# Patient Record
Sex: FEMALE
[De-identification: names, ages, dates, MRNs, and addresses within clinical notes are randomized per-mention and may not be internally consistent; named-entity substitution may affect disease eponyms.]

---

## 2018-11-14 ENCOUNTER — HOSPITAL ENCOUNTER (INPATIENT)
Dept: HOSPITAL 14 - H.EROB2 | Age: 33
LOS: 2 days | Discharge: HOME | End: 2018-11-16
Attending: OBSTETRICS & GYNECOLOGY | Admitting: OBSTETRICS & GYNECOLOGY
Payer: MEDICAID

## 2018-11-14 VITALS — BODY MASS INDEX: 30.7 KG/M2

## 2018-11-14 DIAGNOSIS — Z3A.39: ICD-10-CM

## 2018-11-14 LAB
BASOPHILS # BLD AUTO: 0 K/UL (ref 0–0.2)
BASOPHILS NFR BLD: 0.5 % (ref 0–2)
EOSINOPHIL # BLD AUTO: 0 K/UL (ref 0–0.7)
EOSINOPHIL NFR BLD: 0.6 % (ref 0–4)
ERYTHROCYTE [DISTWIDTH] IN BLOOD BY AUTOMATED COUNT: 15 % (ref 11.5–14.5)
HGB BLD-MCNC: 13.5 G/DL (ref 12–16)
LYMPHOCYTES # BLD AUTO: 2.5 K/UL (ref 1–4.3)
LYMPHOCYTES NFR BLD AUTO: 33.5 % (ref 20–40)
MCH RBC QN AUTO: 30.4 PG (ref 27–31)
MCHC RBC AUTO-ENTMCNC: 32.7 G/DL (ref 33–37)
MCV RBC AUTO: 93.1 FL (ref 81–99)
MONOCYTES # BLD: 0.7 K/UL (ref 0–0.8)
MONOCYTES NFR BLD: 9.3 % (ref 0–10)
NEUTROPHILS # BLD: 4.1 K/UL (ref 1.8–7)
NEUTROPHILS NFR BLD AUTO: 56.1 % (ref 50–75)
NRBC BLD AUTO-RTO: 0.1 % (ref 0–0)
PLATELET # BLD: 151 K/UL (ref 130–400)
PMV BLD AUTO: 10 FL (ref 7.2–11.7)
RBC # BLD AUTO: 4.45 MIL/UL (ref 3.8–5.2)
WBC # BLD AUTO: 7.3 K/UL (ref 4.8–10.8)

## 2018-11-14 PROCEDURE — 4A1HXCZ MONITORING OF PRODUCTS OF CONCEPTION, CARDIAC RATE, EXTERNAL APPROACH: ICD-10-PCS | Performed by: OBSTETRICS & GYNECOLOGY

## 2018-11-14 NOTE — OBDS
===================================

DELIVERY PERSONNEL

===================================

   

Delivery Doctor:  Dr Mcadams

Circulator:  [id number]/[id number]

Resident:  Dr GASPER Carmona(OB Fellow)

   

===================================

MATERNAL INFORMATION

===================================

   

Delivery Anesthesia:  None

Medications in Delivery:  Pitocin

Placenta Cultured:  No

Maternal Complications:  None

   

===================================

LABOR SUMMARY

===================================

   

EDC:  2018 00:00

No. Babies in Womb:  1

 Attempted:  No

Labor Anesthesia:  None

   

===================================

LABOR INFORMATION

===================================

   

Reason for Induction:  Not Applicable

Onset of Labor:  2018 05:00

Complete Dilatation:  2018 10:45

Oxytocin:  N/A

Group B Beta Strep:  Negative

Antibiotics # of Doses:  n/a

Antibiotics Time of Last Dose:  n/a

Steroids Given:  None

Reason Steroids Not Administered:  Not Applicable

   

===================================

MEMBRANES

===================================

   

Membranes Rupture Method:  Spontaneous

Rupture of Membranes:  2018 11:00

Length of Rupture (hrs):  0.05

Amniotic Fluid Color:  Light Meconium

Amniotic Fluid Amount:  Moderate

Amniotic Fluid Odor:  Normal

   

===================================

STAGES OF LABOR

===================================

   

Stage 1 hrs:  5

Stage 1 min:  45

Stage 2 hrs:  0

Stage 2 min:  18

Stage 3 hrs:  0

Stage 3 min:  5

Total Time in Labor hrs:  6

Total Time in Labor min:  8

   

===================================

VAGINAL DELIVERY

===================================

   

Episiotomy:  None

Laceration Extension:  N/A

Laceration Type:  None

Laceration Repair:  Not Applicable

Initial Vag Sponge Count:  Laps=5  and one without ring

Final Vag Sponge Count:  laps=5 and one without ring

Initial Vag Sharps Count:  n/a

Final Vag Sharps Count:  n/a

Sponge Count Correct:  Yes

Sharps Count Correct:  N/A

Count Comment:  laps count correct

   

===================================

BABY A INFORMATION

===================================

   

Infant Delivery Date/Time:  2018 11:03

Method of Delivery:  Vaginal

Born in Route :  No

:  N/A

Forceps:  N/A

Vacuum Extraction:  N/A

Shoulder Dystocia :  No

   

===================================

SHOULDER DYSTOCIA BABY A

===================================

   

Infant Delivery Date/Time:  2018 11:03

   

===================================

PRESENTATION/POSITION BABY A

===================================

   

Presentation:  Cephalic

Cephalic Presentation:  Vertex

Breech Presentation:  N/A

   

===================================

PLACENTA INFORMATION BABY A

===================================

   

Placenta Delivery Time :  2018 11:08

Placenta Method of Delivery:  Spontaneous

Placenta Status:  Delivered

   

===================================

APGAR SCORES BABY A

===================================

   

Heart Rate 1 min:  >100 bpm

Resp Effort 1 min:  Good Cry

Reflex Irritability 1 min:  Cough or Sneeze or Pulls Away

Muscle Tone 1 min:  Active Motion

Color 1 min:  Body Pink, Extremities Blue

Resuscitation Effort 1 min:  Tactile Stimulation

APGAR SCORE 1 MIN:  9

Heart Rate 5 min:  >100 bpm

Resp Effort 5 min:  Good Cry

Reflex Irritability 5 min:  Cough or Sneeze or Pulls Away

Muscle Tone 5 min:  Active Motion

Color 5 min:  Body Pink, Extremities Blue

Resuscitation Effort 5 min:  N/A

APGAR SCORE 5 MIN:  9

   

===================================

INFANT INFORMATION BABY A

===================================

   

Gestational Age at Delivery:  39.3

Gestational Status:  Term

Infant Outcome :  Liveborn

Infant Condition :  Stable

Infant Sex:  Male

   

===================================

IDENTIFICATION/MEDS BABY A

===================================

   

ID Band Number:  [id number]

ID Band Location:  Left Leg; Left Arm



Vitamin K Given :  Not Given

Erythromycin Given:  Not Given

   

===================================

WEIGHT/LENGTH BABY A

===================================

   

Infant Birthweight (gms):  2860

Infant Weight (lb):  6

Infant Weight (oz):  5

   

===================================

CORD INFORMATION BABY A

===================================

   

No. Cord Vessels:  3

Nuchal Cord :  N/A

Nuchal Cord Other:  n/a

True Knot:  n/a

Infant Cord pH Baby Arterial:  n/a

Infant Cord pH Baby Venous:  n/a

Cord Blood Taken:  Yes

Banking/Donate Info:  n/a

Infant Suction:  None

   

===================================

ASSESSMENT BABY A

===================================

   

Infant Complications:  None

Physical Findings at Delivery:  Within Normal Limits

Infant Respirations:  Appears Normal

Neonatologist/ALS Called :  No

Infant Care By:  Alysa

Transferred To:  Remains with Mother

## 2018-11-14 NOTE — OBHP
===========================

Datetime: 11/14/2018 13:12

===========================

   

Admit Comment, IP Provider:  The patient was seen with the resident I agree with the note

## 2018-11-15 LAB
BASOPHILS # BLD AUTO: 0 K/UL (ref 0–0.2)
BASOPHILS NFR BLD: 0.6 % (ref 0–2)
EOSINOPHIL # BLD AUTO: 0.1 K/UL (ref 0–0.7)
EOSINOPHIL NFR BLD: 0.9 % (ref 0–4)
ERYTHROCYTE [DISTWIDTH] IN BLOOD BY AUTOMATED COUNT: 14.8 % (ref 11.5–14.5)
HGB BLD-MCNC: 11.9 G/DL (ref 12–16)
LYMPHOCYTES # BLD AUTO: 2.8 K/UL (ref 1–4.3)
LYMPHOCYTES NFR BLD AUTO: 34.8 % (ref 20–40)
MCH RBC QN AUTO: 30 PG (ref 27–31)
MCHC RBC AUTO-ENTMCNC: 33.2 G/DL (ref 33–37)
MCV RBC AUTO: 90.6 FL (ref 81–99)
MONOCYTES # BLD: 0.7 K/UL (ref 0–0.8)
MONOCYTES NFR BLD: 8.7 % (ref 0–10)
NEUTROPHILS # BLD: 4.4 K/UL (ref 1.8–7)
NEUTROPHILS NFR BLD AUTO: 55 % (ref 50–75)
NRBC BLD AUTO-RTO: 0.1 % (ref 0–0)
PLATELET # BLD: 147 K/UL (ref 130–400)
PMV BLD AUTO: 9.3 FL (ref 7.2–11.7)
RBC # BLD AUTO: 3.95 MIL/UL (ref 3.8–5.2)
WBC # BLD AUTO: 8 K/UL (ref 4.8–10.8)

## 2018-11-16 VITALS
HEART RATE: 82 BPM | TEMPERATURE: 98.3 F | SYSTOLIC BLOOD PRESSURE: 114 MMHG | OXYGEN SATURATION: 100 % | RESPIRATION RATE: 18 BRPM | DIASTOLIC BLOOD PRESSURE: 69 MMHG

## 2018-11-16 NOTE — OBPPN
===========================

Datetime: 2018 05:48

===========================

   

PP Progress Note Prov:  Danny interpretation services - [id number]

   32 y/o  PPD 2 s/p  seen and examined this AM. Patient reports she is tolerating ambulat
on, regular diet, _ pain with medications. Lochia like menses, + flatus, +BM,  breastfeeding w/o comp
laints. Denies any f/c/n/v/d, chest pain or shortness of breath.

      

   VS: stable

   Gen: laying in bed breastfeeding

   Cardio: s1s2 RRR

   Lungs: cResp effort wnl, no wheeze

   Abd: BS +, fundus @ umbilicus, appropriate tenderness

   Ext: calves nontender, nonedematous

      

   A/P: 32 y/o  PPD 2 s/p 

   -Breastfeeding encouraged

   -Ambulation as tolerated encouraged

   -Ibuprofen as needed for pain

   -Discharge today

      

   Case discussed with attending

   -LISA Bonner JP, PGY-1

   Attending addendum:

   I saw and examined the patient myself at bedside this morning. I reviewed the resident note above 
and agree with findings and management.

   DC home today.

   Prema Castano MD

## 2018-11-16 NOTE — OBPPN
===========================

Datetime: 2018 05:48

===========================

   

PP Pain Prov:  Within normal limits

PP Nausea Prov:  Denies

PP Flatus Prov:  Yes

PP BM Prov:  No

PP Heart Prov:  Normal

PP Lungs Prov:  Normal

PP Abdomen/Uterus Prov:  Normal

PP Lochia Prov:  Normal

PP Extremities Prov:  Normal

PP Impression Prov:  Normal postpartum progression

PP Plan Prov:  Continue present management; Discharge

PP Progress Note Prov:  Voyce interpretation services - [id number]

   34 y/o  PPD 2 s/p  seen and examined this AM. Patient reports she is tolerating ambulat
on, regular diet, _ pain with medications. Lochia like menses, + flatus, +BM,  breastfeeding w/o comp
laints. Denies any f/c/n/v/d, chest pain or shortness of breath.

      

   VS: stable

   Gen: laying in bed breastfeeding

   Cardio: s1s2 RRR

   Lungs: cResp effort wnl, no wheeze

   Abd: BS +, fundus @ umbilicus, appropriate tenderness

   Ext: calves nontender, nonedematous

      

   A/P: 34 y/o  PPD 2 s/p 

   -Breastfeeding encouraged

   -Ambulation as tolerated encouraged

   -Ibuprofen as needed for pain

   -Discharge today

      

   Case discussed with attending

   -LISA Bonner JP, PGY-1

Vital Signs Provider PP:  Reviewed; Within Normal Limits

   

===========================

Datetime: 11/15/2018 13:52

===========================

   

PP Breasts Prov:  Not Done

PP Vulva/Perineum Prov:  Not Done

PP CVA Tenderness Prov:  Not Done

PP C/S Incision Prov:  Not Applicable

PP Breastfeeding Progress Prov:  Normal

IP PP Procedures:  None

## 2018-11-16 NOTE — OBDCSUM
===========================

Datetime: 2018 05:07

===========================

   

Discharged to, Provider:  Home

Follow up at, Provider:  Your doctor

Disch Instr Activity:  May be up to bathroom; May be up for meals; May Shower

Disch Instr Diet:  Regular

Discharge Instructions, Provider:  Routine instructions given

Discharge Diagnosis, Provider:  Term Pregnancy Delivered

Follow up in weeks, Provider:  4-6 weeks

Disch Referrals:  None

Disch Activity Restrictions:  No lifting; Minimize stair-climbing; No sexual activity; Nothing in vag
brock - [place], tampons, douche

Discharge Comment, Provider:  -Continue breastfeeding.

   -Use Ibuprofen 600mg every 4 hours for mild pain.

   -Walk as much as you are able.

   -You may be up to shower, but no lifting, minimize stair climbing.

   -Nothing in the vagina for 6 weeks ( no tampons nosex).

   -Follow up with your 's pediatrician in 3-5 days.

   -Follow up with your OB in 4-6 weeks for wound check.

   Attending addendum:

   I saw and examined the patient myself at bedside this morning. I reviewed the resident note above 
and agree with findings and management.

   DC home today.

   Prema Castano MD

Contraception after Delivery:  Birth Control Pill/Patch